# Patient Record
Sex: MALE | Race: WHITE
[De-identification: names, ages, dates, MRNs, and addresses within clinical notes are randomized per-mention and may not be internally consistent; named-entity substitution may affect disease eponyms.]

---

## 2019-08-22 ENCOUNTER — HOSPITAL ENCOUNTER (EMERGENCY)
Dept: HOSPITAL 41 - JD.ED | Age: 29
Discharge: HOME | End: 2019-08-22
Payer: SELF-PAY

## 2019-08-22 DIAGNOSIS — R07.89: Primary | ICD-10-CM

## 2019-08-22 NOTE — EDM.PDOC
ED HPI GENERAL MEDICAL PROBLEM





- General


Chief Complaint: Chest Pain


Stated Complaint: chest pain


Time Seen by Provider: 08/22/19 21:03


Source of Information: Reports: Patient, RN Notes Reviewed





- History of Present Illness


INITIAL COMMENTS - FREE TEXT/NARRATIVE: 





29-year-old male has been having intermittent achy discomfort left chest and 

also midabdomen off-and-on for the last 2 days. There has been some radiation 

of discomfort toward the left shoulder but nothing down into the arm. No 

shortness of breath or difficulty breathing. He feels like he has less energy 

than usual. He does not feel as mentally sharp as usual. She was started on 

Lexapro about a week or 10 days ago and that the above symptoms have become 

more prevalent since that time. He states he does have difficulty with anxiety. 

He had been previously taking Ativan on a when necessary basis. He does not 

have personal history for hypertension diabetes or known coronary artery 

disease. He does not smoke.


  ** Chest


Pain Score (Numeric/FACES): 7





- Related Data


 Allergies











Allergy/AdvReac Type Severity Reaction Status Date / Time


 


No Known Allergies Allergy   Verified 08/22/19 21:00











Home Meds: 


 Home Meds





Escitalopram [Lexapro] 10 mg PO DAILY 08/22/19 [History]











Past Medical History





- Past Health History


Medical/Surgical History: Denies Medical/Surgical History


Psychiatric History: Reports: Anxiety


Other Psychiatric History: stopped lexapro about 1 yr ago





Social & Family History





- Tobacco Use


Smoking Status *Q: Never Smoker





- Caffeine Use


Caffeine Use: Reports: Coffee, Energy Drinks, Soda





- Recreational Drug Use


Recreational Drug Use: No





ED ROS GENERAL





- Review of Systems


Review Of Systems: See Below


Constitutional: Denies: Fever, Chills, Diaphoresis


HEENT: Denies: Throat Pain


Respiratory: Denies: Shortness of Breath, Pleuritic Chest Pain, Cough


Cardiovascular: Reports: Chest Pain


Endocrine: Reports: Fatigue


GI/Abdominal: Reports: Decreased Appetite.  Denies: Abdominal Pain, Nausea, 

Vomiting


Musculoskeletal: Reports: Shoulder Pain.  Denies: Neck Pain, Arm Pain, Back Pain


Skin: Reports: No Symptoms


Neurological: Denies: Numbness, Tingling, Weakness





ED EXAM, GENERAL





- Physical Exam


Exam: See Below


General Appearance: Alert, No Apparent Distress


Eye Exam: Bilateral Eye: PERRL


Throat/Mouth: Normal Inspection, Normal Oropharynx


Head: Atraumatic.  No: Facial Swelling


Neck: Supple.  No: Lymphadenopathy (L), Lymphadenopathy (R)


Respiratory/Chest: No Respiratory Distress, Lungs Clear, Normal Breath Sounds, 

Chest Non-Tender


Cardiovascular: Regular Rate, Rhythm


GI/Abdominal: Soft, Non-Tender.  No: Guarding


Extremities: Normal Inspection, Normal Range of Motion.  No: Pedal Edema, Leg 

Pain


Neurological: Alert, Oriented, No Motor/Sensory Deficits


Skin Exam: Warm, Dry, Normal Color





EKG INTERPRETATION


EKG Date: 08/22/19


Rhythm: Other (Sinus bradycardia)


Rate (Beats/Min): 56


Axis: Normal


P-Wave: Present


QRS: Normal


ST-T: Other (There is T-wave inversion in lead III)





Course





- Vital Signs


Last Recorded V/S: 


 Last Vital Signs











Temp  98.4 F   08/22/19 21:00


 


Pulse  46 L  08/22/19 21:00


 


Resp  15   08/22/19 21:00


 


BP  142/84 H  08/22/19 21:00


 


Pulse Ox  98   08/22/19 21:00














- Orders/Labs/Meds


Orders: 


 Active Orders 24 hr











 Category Date Time Status


 


 EKG 12 Lead [EKG Documentation Completion] [RC] STAT Care  08/22/19 21:16 

Active


 


 EKG Documentation Completion [RC] STAT Care  08/22/19 21:04 Active











Labs: 


 Laboratory Tests











  08/22/19 08/22/19 Range/Units





  21:27 21:27 


 


WBC  7.03   (4.23-9.07)  K/mm3


 


RBC  5.33   (4.63-6.08)  M/mm3


 


Hgb  15.6   (13.7-17.5)  gm/L


 


Hct  44.0   (40.1-51.0)  %


 


MCV  82.6   (79.0-92.2)  fl


 


MCH  29.3   (25.7-32.2)  pg


 


MCHC  35.5   (32.2-35.5)  g/dl


 


RDW Std Deviation  38.8   (35.1-43.9)  fL


 


Plt Count  271   (163-337)  K/mm3


 


MPV  9.6   (9.4-12.3)  fl


 


Neut % (Auto)  58.2   (34.0-67.9)  %


 


Lymph % (Auto)  32.9   (21.8-53.1)  %


 


Mono % (Auto)  8.1   (5.3-12.2)  %


 


Eos % (Auto)  0.6 L   (0.8-7.0)  


 


Baso % (Auto)  0.1   (0.1-1.2)  %


 


Neut # (Auto)  4.09   (1.78-5.38)  K/mm3


 


Lymph # (Auto)  2.31   (1.32-3.57)  K/mm3


 


Mono # (Auto)  0.57   (0.30-0.82)  K/mm3


 


Eos # (Auto)  0.04   (0.04-0.54)  K/mm3


 


Baso # (Auto)  0.01   (0.01-0.08)  K/mm3


 


Sodium   145  (136-145)  mEq/L


 


Potassium   3.7  (3.5-5.1)  mEq/L


 


Chloride   108 H  ()  mEq/L


 


Carbon Dioxide   28  (21-32)  mEq/L


 


Anion Gap   12.7  (5-15)  


 


BUN   18  (7-18)  mg/dL


 


Creatinine   1.2  (0.7-1.3)  mg/dL


 


Est Cr Clr Drug Dosing   97.61  mL/min


 


Estimated GFR (MDRD)   > 60  (>60)  mL/min


 


BUN/Creatinine Ratio   15.0  (14-18)  


 


Glucose   88  ()  mg/dL


 


Calcium   9.4  (8.5-10.1)  mg/dL


 


Total Bilirubin   0.4  (0.2-1.0)  mg/dL


 


AST   16  (15-37)  U/L


 


ALT   34  (16-63)  U/L


 


Alkaline Phosphatase   75  ()  U/L


 


Troponin I   < 0.017  (0.00-0.056)  ng/mL


 


Total Protein   7.7  (6.4-8.2)  g/dl


 


Albumin   4.1  (3.4-5.0)  g/dl


 


Globulin   3.6  gm/dL


 


Albumin/Globulin Ratio   1.1  (1-2)  














- Re-Assessments/Exams


Free Text/Narrative Re-Assessment/Exam: 





08/23/19 03:28


Troponin, other labs did come back normal, he has been in sinus rhythm, no 

ectopy. He did show some bradycardia initially and at time of EKG but also has 

had heart rate up into the 60s and 70s.





Departure





- Departure


Time of Disposition: 22:41


Disposition: Home, Self-Care 01


Condition: Fair


Clinical Impression: 


 Atypical chest pain





Instructions:  Nonspecific Chest Pain


Referrals: 


PCP,Not In Area [Primary Care Provider] - 


Forms:  ED Department Discharge


Additional Instructions: 


Rest, drink plenty of water to maintain hydration, consider taking a 

multivitamin daily for now until you do get feeling better. Reduce your dosage 

of Lexapro to one half tablet daily for now,  follow up with your regular 

medical provider next week for recheck, call for appointment.





- My Orders


Last 24 Hours: 


My Active Orders





08/22/19 21:04


EKG Documentation Completion [RC] STAT 





08/22/19 21:16


EKG 12 Lead [EKG Documentation Completion] [RC] STAT 














- Assessment/Plan


Last 24 Hours: 


My Active Orders





08/22/19 21:04


EKG Documentation Completion [RC] STAT 





08/22/19 21:16


EKG 12 Lead [EKG Documentation Completion] [RC] STAT